# Patient Record
Sex: FEMALE | Race: WHITE | NOT HISPANIC OR LATINO | Employment: FULL TIME | URBAN - NONMETROPOLITAN AREA
[De-identification: names, ages, dates, MRNs, and addresses within clinical notes are randomized per-mention and may not be internally consistent; named-entity substitution may affect disease eponyms.]

---

## 2018-10-04 ENCOUNTER — HISTORICAL (OUTPATIENT)
Dept: ADMINISTRATIVE | Facility: HOSPITAL | Age: 23
End: 2018-10-04

## 2018-12-27 ENCOUNTER — HISTORICAL (OUTPATIENT)
Dept: ADMINISTRATIVE | Facility: HOSPITAL | Age: 23
End: 2018-12-27

## 2020-01-09 LAB
CLARITY, POC UA: CLEAR
COLOR, POC UA: YELLOW
GLUCOSE UR QL STRIP: NEGATIVE
LEUKOCYTE EST, POC UA: NEGATIVE
NITRITE, POC UA: NEGATIVE
PROTEIN, POC: NEGATIVE

## 2020-02-18 LAB
CLARITY, POC UA: CLEAR
COLOR, POC UA: YELLOW
GLUCOSE UR QL STRIP: NEGATIVE
LEUKOCYTE EST, POC UA: NEGATIVE
NITRITE, POC UA: NEGATIVE
PROTEIN, POC: NORMAL

## 2020-02-27 ENCOUNTER — HISTORICAL (OUTPATIENT)
Dept: ADMINISTRATIVE | Facility: HOSPITAL | Age: 25
End: 2020-02-27

## 2021-03-16 LAB
BILIRUB SERPL-MCNC: NEGATIVE MG/DL
CLARITY, POC UA: CLEAR
COLOR, POC UA: YELLOW
GLUCOSE UR QL STRIP: NEGATIVE
KETONES UR QL STRIP: NEGATIVE
LEUKOCYTE EST, POC UA: NEGATIVE
NITRITE, POC UA: NEGATIVE
PH, POC UA: 6
POC BETA-HCG (QUAL): POSITIVE
PROTEIN, POC: NEGATIVE
SPECIFIC GRAVITY, POC UA: 1.03
UROBILINOGEN, POC UA: NORMAL

## 2021-04-08 LAB
BASOPHILS # BLD AUTO: 0.01 X10(3)/MCL (ref 0.01–0.08)
BASOPHILS NFR BLD AUTO: 0.1 % (ref 0.1–1.2)
EOSINOPHIL # BLD AUTO: 0.06 X10(3)/MCL (ref 0.04–0.36)
EOSINOPHIL NFR BLD AUTO: 0.6 % (ref 0.7–7)
ERYTHROCYTE [DISTWIDTH] IN BLOOD BY AUTOMATED COUNT: 12.6 % (ref 11–14.5)
HCT VFR BLD AUTO: 41 % (ref 36–48)
HGB BLD-MCNC: 13.7 G/DL (ref 11.8–16)
IMM GRANULOCYTES # BLD AUTO: 0.01 X10E3/UL (ref 0–0.03)
IMM GRANULOCYTES NFR BLD AUTO: 0.1 % (ref 0–0.5)
LYMPHOCYTES # BLD AUTO: 2.44 X10(3)/MCL (ref 1.16–3.74)
LYMPHOCYTES NFR BLD AUTO: 25.8 % (ref 20–55)
MCH RBC QN AUTO: 30.4 PG (ref 27–34)
MCHC RBC AUTO-ENTMCNC: 33.4 G/DL (ref 31–37)
MCV RBC AUTO: 90.9 FL (ref 79–99)
MONOCYTES # BLD AUTO: 0.78 X10(3)/MCL (ref 0.24–0.36)
MONOCYTES NFR BLD AUTO: 8.2 % (ref 4.7–12.5)
NEUTROPHILS # BLD AUTO: 6.17 X10(3)/MCL (ref 1.56–6.13)
NEUTROPHILS NFR BLD AUTO: 65.2 % (ref 37–73)
PLATELET # BLD AUTO: 229 X10(3)/MCL (ref 140–371)
PMV BLD AUTO: 10.2 FL (ref 9.4–12.4)
RBC # BLD AUTO: 4.51 X10(6)/MCL (ref 4–5.1)
WBC # SPEC AUTO: 9.5 X10(3)/MCL (ref 4–11.5)

## 2021-06-01 ENCOUNTER — HISTORICAL (OUTPATIENT)
Dept: ADMINISTRATIVE | Facility: HOSPITAL | Age: 26
End: 2021-06-01

## 2021-08-25 LAB
BASOPHILS # BLD AUTO: 0.02 X10(3)/MCL (ref 0.01–0.08)
BASOPHILS NFR BLD AUTO: 0.2 % (ref 0.1–1.2)
EOSINOPHIL # BLD AUTO: 0.09 X10(3)/MCL (ref 0.04–0.36)
EOSINOPHIL NFR BLD AUTO: 0.9 % (ref 0.7–7)
ERYTHROCYTE [DISTWIDTH] IN BLOOD BY AUTOMATED COUNT: 11.8 % (ref 11–14.5)
GLUCOSE 1H P 100 G GLC PO SERPL-MCNC: 149 MG/DL (ref 70–140)
HCT VFR BLD AUTO: 32.6 % (ref 36–48)
HGB BLD-MCNC: 10.9 G/DL (ref 11.8–16)
IMM GRANULOCYTES # BLD AUTO: 0.04 X10E3/UL (ref 0–0.03)
IMM GRANULOCYTES NFR BLD AUTO: 0.4 % (ref 0–0.5)
LYMPHOCYTES # BLD AUTO: 1.9 X10(3)/MCL (ref 1.16–3.74)
LYMPHOCYTES NFR BLD AUTO: 19.9 % (ref 20–55)
MCH RBC QN AUTO: 30.8 PG (ref 27–34)
MCHC RBC AUTO-ENTMCNC: 33.4 G/DL (ref 31–37)
MCV RBC AUTO: 92.1 FL (ref 79–99)
MONOCYTES # BLD AUTO: 0.64 X10(3)/MCL (ref 0.24–0.36)
MONOCYTES NFR BLD AUTO: 6.7 % (ref 4.7–12.5)
NEUTROPHILS # BLD AUTO: 6.85 X10(3)/MCL (ref 1.56–6.13)
NEUTROPHILS NFR BLD AUTO: 71.9 % (ref 37–73)
PLATELET # BLD AUTO: 184 X10(3)/MCL (ref 140–371)
PMV BLD AUTO: 10.4 FL (ref 9.4–12.4)
RBC # BLD AUTO: 3.54 X10(6)/MCL (ref 4–5.1)
WBC # SPEC AUTO: 9.5 X10(3)/MCL (ref 4–11.5)

## 2021-09-28 LAB
CLARITY, POC UA: CLEAR
COLOR, POC UA: YELLOW
GLUCOSE UR QL STRIP: NEGATIVE
LEUKOCYTE EST, POC UA: NEGATIVE
NITRITE, POC UA: NEGATIVE
POC SITE: NORMAL
PROTEIN, POC: NEGATIVE

## 2021-10-04 LAB
CLARITY, POC UA: CLEAR
COLOR, POC UA: YELLOW
GLUCOSE UR QL STRIP: NEGATIVE
HBV SURFACE AG SERPL QL IA: NEGATIVE
HIV 1+2 AB+HIV1 P24 AG SERPL QL IA: NORMAL
LEUKOCYTE EST, POC UA: NEGATIVE
NITRITE, POC UA: NEGATIVE
PROTEIN, POC: NEGATIVE
RPR SER QL: NORMAL

## 2022-04-11 ENCOUNTER — HISTORICAL (OUTPATIENT)
Dept: ADMINISTRATIVE | Facility: HOSPITAL | Age: 27
End: 2022-04-11

## 2022-04-25 VITALS
DIASTOLIC BLOOD PRESSURE: 70 MMHG | SYSTOLIC BLOOD PRESSURE: 122 MMHG | BODY MASS INDEX: 26.01 KG/M2 | HEIGHT: 62 IN | WEIGHT: 141.31 LBS

## 2022-05-09 ENCOUNTER — HISTORICAL (OUTPATIENT)
Dept: ADMINISTRATIVE | Facility: HOSPITAL | Age: 27
End: 2022-05-09

## 2022-09-22 ENCOUNTER — HISTORICAL (OUTPATIENT)
Dept: ADMINISTRATIVE | Facility: HOSPITAL | Age: 27
End: 2022-09-22

## 2022-09-23 ENCOUNTER — HISTORICAL (OUTPATIENT)
Dept: ADMINISTRATIVE | Facility: HOSPITAL | Age: 27
End: 2022-09-23

## 2024-02-05 NOTE — PROGRESS NOTES
Chief Complaint: Annual exam      HPI:   Genie George is a 28 y.o. year old  here for her Annual Exam. Reports cyclic menses, 6 days in duration, heavy flow, severe dysmenorrhea. States dysmenorrhea to be increasing in severity the last several months.  Pain is worse on the left side.  Also has painful defecation during menses. Occ dyspareunia after menses. Denies bleeding between cycles, vaginal discharge, odor.     Gyn hx:  LMP: 24  Frequency: monthly  Cycle length: 6 days  Flow: heavy  Intermenstrual bleeding: No  Postcoital bleeding: No  Dysmenorrhea: Yes severe  Sexually active: yes  Dyspareunia: No  Contraception: none  H/o STI: No   Last pap: 3/16/21 ASCUS neg cz/gc  H/o abnl pap: Yes  Colposcopy: no  Gardasil: 0/3  MMG: never had one  H/o abnl MMG: n/a  Colonoscopy: never had one      No past medical history on file.  No past surgical history on file.  No current outpatient medications on file.  Review of patient's allergies indicates:  Not on File  OB History   No obstetric history on file.        No family history on file.    Review of Systems:   Review of Systems   Constitutional:  Negative for appetite change, chills, fatigue, fever and unexpected weight change.   Eyes:  Negative for visual disturbance.   Respiratory:  Negative for cough, shortness of breath and wheezing.    Cardiovascular:  Negative for chest pain, palpitations and leg swelling.   Gastrointestinal:  Negative for abdominal pain, bloating, blood in stool, constipation, diarrhea, nausea, vomiting, reflux and fecal incontinence.   Endocrine: Negative for hair loss and hot flashes.   Genitourinary:  Positive for dysmenorrhea, menorrhagia and pelvic pain. Negative for bladder incontinence, decreased libido, dyspareunia, dysuria, flank pain, frequency, genital sores, hematuria, hot flashes, menstrual problem, urgency, vaginal bleeding, vaginal discharge, vaginal pain, urinary incontinence, postcoital bleeding, postmenopausal  "bleeding, vaginal dryness and vaginal odor.   Integumentary:  Negative for rash, acne, hair changes, breast mass, nipple discharge, breast skin changes and breast tenderness.   Neurological:  Negative for headaches.   Psychiatric/Behavioral:  Negative for depression.    Breast: Negative for asymmetry, breast self exam, lump, mass, mastodynia, nipple discharge, skin changes and tenderness       Physical Exam:  /64 (BP Location: Right arm, Patient Position: Sitting, BP Method: Medium (Manual))   Temp 97.5 °F (36.4 °C) (Temporal)   Ht 5' 1" (1.549 m)   Wt 61.5 kg (135 lb 9.3 oz)   LMP 01/26/2024   BMI 25.62 kg/m²       Physical Exam:   Constitutional: She is oriented to person, place, and time. She appears well-developed and well-nourished.    HENT:   Head: Normocephalic.      Cardiovascular:       Exam reveals no edema.        Pulmonary/Chest: Effort normal. She exhibits no mass, no tenderness, no bony tenderness, no deformity and no retraction. Right breast exhibits no inverted nipple, no mass, no nipple discharge, no skin change, no tenderness, no bleeding, no swelling, no mastectomy, no augmentation and no lumpectomy. Left breast exhibits no inverted nipple, no mass, no nipple discharge, no skin change, no tenderness, no bleeding, no swelling, no mastectomy, no augmentation and no lumpectomy. Breasts are symmetrical.        Abdominal: Soft. She exhibits no distension and no mass. There is no abdominal tenderness. There is no rebound and no guarding. No hernia. Hernia confirmed negative in the right inguinal area.     Genitourinary:    Inguinal canal, vagina, uterus, right adnexa, left adnexa and rectum normal.   Rectum:      No anal fissure or external hemorrhoid.   The external female genitalia was normal.   No external genitalia lesions identified,Genitalia hair distrobution normal .     Labial bartholins normal.There is no rash, tenderness, lesion or injury on the right labia. There is no rash, " tenderness, lesion or injury on the left labia. Cervix is normal. No no masses or organomegaly. Right adnexum displays no mass, no tenderness and no fullness. Left adnexum displays no mass, no tenderness and no fullness. Vagina exhibits no lesion. No erythema, vaginal discharge, tenderness, bleeding, rectocele, cystocele or prolapse of vaginal walls in the vagina.    No foreign body in the vagina.      No signs of injury in the vagina.   Vagina was moist.Cervix exhibits no motion tenderness, no lesion, no discharge, no friability, no tenderness and no polyp. Uterus consistancy normal and Uerus contour normal  Uterus is not deviated, not enlarged, not fixed, not tender, not hosting fibroids and no uterine prolapse. Normal urethral meatus.Urethral Meatus exhibits: urethral lesionUrethra findings: no urethral mass, no tenderness and prolapsedBladder findings: no bladder tenderness          Musculoskeletal: Normal range of motion.      Lymphadenopathy: No inguinal adenopathy noted on the right or left side.    Neurological: She is alert and oriented to person, place, and time.    Skin: Skin is warm and dry.    Psychiatric: She has a normal mood and affect. Her behavior is normal. Judgment and thought content normal.        Assessment:   Annual Well Women Exam  1. Encounter for well woman exam with abnormal findings    2. Dysmenorrhea  - US Pelvis Comp with Transvag NON-OB (xpd; Future  - norethindrone-e.estradioL-iron (LO LOESTRIN FE) 1 mg-10 mcg (24)/10 mcg (2) Tab; Take 1 tablet by mouth once daily.  Dispense: 30 tablet; Refill: 11    3. Menorrhagia with regular cycle  - US Pelvis Comp with Transvag NON-OB (xpd; Future  - norethindrone-e.estradioL-iron (LO LOESTRIN FE) 1 mg-10 mcg (24)/10 mcg (2) Tab; Take 1 tablet by mouth once daily.  Dispense: 30 tablet; Refill: 11    4. Pelvic pain  - US Pelvis Comp with Transvag NON-OB (xpd; Future  - norethindrone-e.estradioL-iron (LO LOESTRIN FE) 1 mg-10 mcg (24)/10 mcg (2)  Tab; Take 1 tablet by mouth once daily.  Dispense: 30 tablet; Refill: 11    5. Painful defecation        Plan:  Pap Done  Oneswab GC CZ TV MYCO UREA   Breast Self-awareness  Recommend exercise at least 3 times weekly  Healthy, balanced diet  Keep yearly follow up with PCP    TVUS    Discussed dysmenorrhea, menorrhagia, pelvic pain and treatment options with pt  Discussed potential endometriosis   Discussed contraception options for menses, dysmenorrhea, menorrhagia  Desires trial of oral contraception  Rx Lo Loestrin  If no relief, consider Orilissa    Recommend Ibuprofen 800mg q 8 hours or 600mg q 6 hours as needed. Precautions     RTC 4 months f/u

## 2024-02-06 ENCOUNTER — OFFICE VISIT (OUTPATIENT)
Dept: OBSTETRICS AND GYNECOLOGY | Facility: CLINIC | Age: 29
End: 2024-02-06
Payer: COMMERCIAL

## 2024-02-06 VITALS
HEIGHT: 61 IN | WEIGHT: 135.56 LBS | DIASTOLIC BLOOD PRESSURE: 64 MMHG | BODY MASS INDEX: 25.59 KG/M2 | TEMPERATURE: 98 F | SYSTOLIC BLOOD PRESSURE: 116 MMHG

## 2024-02-06 DIAGNOSIS — N92.0 MENORRHAGIA WITH REGULAR CYCLE: ICD-10-CM

## 2024-02-06 DIAGNOSIS — R10.2 PELVIC PAIN: ICD-10-CM

## 2024-02-06 DIAGNOSIS — R19.8 PAINFUL DEFECATION: ICD-10-CM

## 2024-02-06 DIAGNOSIS — N94.6 DYSMENORRHEA: ICD-10-CM

## 2024-02-06 DIAGNOSIS — Z01.411 ENCOUNTER FOR WELL WOMAN EXAM WITH ABNORMAL FINDINGS: ICD-10-CM

## 2024-02-06 DIAGNOSIS — Z12.4 SCREENING FOR MALIGNANT NEOPLASM OF THE CERVIX: Primary | ICD-10-CM

## 2024-02-06 PROCEDURE — 1159F MED LIST DOCD IN RCRD: CPT | Mod: CPTII,,, | Performed by: OBSTETRICS & GYNECOLOGY

## 2024-02-06 PROCEDURE — 3008F BODY MASS INDEX DOCD: CPT | Mod: CPTII,,, | Performed by: OBSTETRICS & GYNECOLOGY

## 2024-02-06 PROCEDURE — 99395 PREV VISIT EST AGE 18-39: CPT | Mod: ,,, | Performed by: OBSTETRICS & GYNECOLOGY

## 2024-02-06 PROCEDURE — 3078F DIAST BP <80 MM HG: CPT | Mod: CPTII,,, | Performed by: OBSTETRICS & GYNECOLOGY

## 2024-02-06 PROCEDURE — 3074F SYST BP LT 130 MM HG: CPT | Mod: CPTII,,, | Performed by: OBSTETRICS & GYNECOLOGY

## 2024-02-06 RX ORDER — NORETHINDRONE ACETATE AND ETHINYL ESTRADIOL, ETHINYL ESTRADIOL AND FERROUS FUMARATE 1MG-10(24)
1 KIT ORAL DAILY
Qty: 30 TABLET | Refills: 11 | Status: SHIPPED | OUTPATIENT
Start: 2024-02-06 | End: 2024-05-30 | Stop reason: ALTCHOICE

## 2024-02-06 RX ORDER — IBUPROFEN 800 MG/1
800 TABLET ORAL EVERY 8 HOURS PRN
Qty: 90 TABLET | Refills: 2 | Status: SHIPPED | OUTPATIENT
Start: 2024-02-06

## 2024-02-08 ENCOUNTER — TELEPHONE (OUTPATIENT)
Dept: OBSTETRICS AND GYNECOLOGY | Facility: CLINIC | Age: 29
End: 2024-02-08
Payer: COMMERCIAL

## 2024-02-08 ENCOUNTER — HOSPITAL ENCOUNTER (OUTPATIENT)
Dept: RADIOLOGY | Facility: HOSPITAL | Age: 29
Discharge: HOME | End: 2024-02-08
Attending: OBSTETRICS & GYNECOLOGY
Payer: COMMERCIAL

## 2024-02-08 DIAGNOSIS — N92.0 MENORRHAGIA WITH REGULAR CYCLE: ICD-10-CM

## 2024-02-08 DIAGNOSIS — R10.2 PELVIC PAIN: ICD-10-CM

## 2024-02-08 DIAGNOSIS — N94.6 DYSMENORRHEA: ICD-10-CM

## 2024-02-08 DIAGNOSIS — N83.202 LEFT OVARIAN CYST: Primary | ICD-10-CM

## 2024-02-08 PROCEDURE — 76830 TRANSVAGINAL US NON-OB: CPT | Mod: TC

## 2024-02-08 NOTE — TELEPHONE ENCOUNTER
----- Message from Augie Calles MD sent at 2/8/2024 12:03 PM CST -----  Recommend repeat US in 6 weeks to f/u Left ov cyst

## 2024-02-09 LAB — PSYCHE PATHOLOGY RESULT: NORMAL

## 2024-03-11 ENCOUNTER — TELEPHONE (OUTPATIENT)
Dept: OBSTETRICS AND GYNECOLOGY | Facility: CLINIC | Age: 29
End: 2024-03-11
Payer: COMMERCIAL

## 2024-03-11 NOTE — TELEPHONE ENCOUNTER
----- Message from Nola Reyes sent at 3/11/2024  9:14 AM CDT -----  Regarding: PT Advice  Contact: Patient  CALLER: Patient  Telephone Information:  Mobile          [phone]  :  REASON FOR CALL:   Patient recently started her birth control on 2/20/24 and now has been on her cycle for 10 days. She has an US scheduled at the hospital tomorrow and asking if she should still go?      [id number] - [place] - [address] AT [facility] [place] [address]  Phone: [phone] Fax: [phone]  :

## 2024-03-12 ENCOUNTER — HOSPITAL ENCOUNTER (OUTPATIENT)
Dept: RADIOLOGY | Facility: HOSPITAL | Age: 29
Discharge: HOME | End: 2024-03-12
Attending: OBSTETRICS & GYNECOLOGY
Payer: COMMERCIAL

## 2024-03-12 DIAGNOSIS — N83.202 LEFT OVARIAN CYST: ICD-10-CM

## 2024-03-12 PROCEDURE — 76830 TRANSVAGINAL US NON-OB: CPT | Mod: TC

## 2024-03-13 ENCOUNTER — TELEPHONE (OUTPATIENT)
Dept: OBSTETRICS AND GYNECOLOGY | Facility: CLINIC | Age: 29
End: 2024-03-13
Payer: COMMERCIAL

## 2024-03-13 NOTE — TELEPHONE ENCOUNTER
Call and spoke with pt and per Dr Calles to let her  know the cyst has resolved and the other finding of moderately thickened endometrium is perfectly normal for a premenopausal woman. Pt states that she she still have concerns and that she have been bleeding for 12 days on birth control and if it is not endometrium if she needs to continue taking the birth control. I ask pt if she would like to schedule an appointment to come in and discuss the results with Dr Calles, pt stated that she would like to come in and speak with him. Pt was transferred to [facility] to schedule a appointment.

## 2024-03-13 NOTE — TELEPHONE ENCOUNTER
----- Message from Augie Calles MD sent at 3/13/2024 10:44 AM CDT -----  Please let her know the cyst has resolved and the other finding of moderately thickened endometrium is perfectly normal for a premenopausal woman.

## 2024-03-20 ENCOUNTER — OFFICE VISIT (OUTPATIENT)
Dept: OBSTETRICS AND GYNECOLOGY | Facility: CLINIC | Age: 29
End: 2024-03-20
Payer: COMMERCIAL

## 2024-03-20 VITALS
HEIGHT: 61 IN | WEIGHT: 140 LBS | SYSTOLIC BLOOD PRESSURE: 110 MMHG | BODY MASS INDEX: 26.43 KG/M2 | DIASTOLIC BLOOD PRESSURE: 66 MMHG

## 2024-03-20 DIAGNOSIS — R10.2 PELVIC PAIN: ICD-10-CM

## 2024-03-20 DIAGNOSIS — N83.202 LEFT OVARIAN CYST: ICD-10-CM

## 2024-03-20 DIAGNOSIS — N80.9 ENDOMETRIOSIS: Primary | ICD-10-CM

## 2024-03-20 DIAGNOSIS — N94.6 DYSMENORRHEA: ICD-10-CM

## 2024-03-20 DIAGNOSIS — L70.9 ACNE, UNSPECIFIED ACNE TYPE: ICD-10-CM

## 2024-03-20 DIAGNOSIS — N92.0 MENORRHAGIA WITH REGULAR CYCLE: ICD-10-CM

## 2024-03-20 PROCEDURE — 3008F BODY MASS INDEX DOCD: CPT | Mod: CPTII,,, | Performed by: OBSTETRICS & GYNECOLOGY

## 2024-03-20 PROCEDURE — 99213 OFFICE O/P EST LOW 20 MIN: CPT | Mod: ,,, | Performed by: OBSTETRICS & GYNECOLOGY

## 2024-03-20 PROCEDURE — 3074F SYST BP LT 130 MM HG: CPT | Mod: CPTII,,, | Performed by: OBSTETRICS & GYNECOLOGY

## 2024-03-20 PROCEDURE — 1159F MED LIST DOCD IN RCRD: CPT | Mod: CPTII,,, | Performed by: OBSTETRICS & GYNECOLOGY

## 2024-03-20 PROCEDURE — 3078F DIAST BP <80 MM HG: CPT | Mod: CPTII,,, | Performed by: OBSTETRICS & GYNECOLOGY

## 2024-03-20 RX ORDER — DROSPIRENONE AND ESTETROL 3-14.2(28)
1 KIT ORAL DAILY
Qty: 28 TABLET | Refills: 0 | COMMUNITY
Start: 2024-03-20 | End: 2024-04-06 | Stop reason: SDUPTHER

## 2024-03-20 NOTE — PROGRESS NOTES
Chief Complaint     Follow-up (F/u pelvic US. )    HPI:     Patient is a 28 y.o.  presents to follow up PAP, Oneswab, Pelvic u/s, Lo Loestrin for complaints of dysmenorrhea, menorrhagia, pelvic pain, painful defecation during menses, dyspareunia. Reports cycle lasting x2 weeks, stopped bleeding x2 days, began bleeding again x1 week. Reports 5 lbs weight gain since beginning contraception. She also developed acne almost immediately after starting the medication.       GYN HX:  LMP: 3/2/2024  Frequency: monthly  Cycle length: 6 days  Flow: heavy  Intermenstrual bleeding: No  Postcoital bleeding: No  Dysmenorrhea: Yes severe  Sexually active: yes  Dyspareunia: No  Contraception: none  H/o STI: No   Last pap: 2024 wnl  H/o abnl pap: Yes  Colposcopy: no  Gardasil: 0/3  MMG: never had one  H/o abnl MMG: n/a  Colonoscopy: never had one    24:  PAP: WNL  Oneswab GC CZ TV MYCO UREA: negative     Pelvic u/s 3/12/24:  FINDINGS:  Uterus: The uterus measures 8.0 x 4.4 x 4.1 cm with no worrisome myometrial abnormalities appreciated.  There is mild to moderate thickening of the endometrial stripe which measures 9 mm in AP thickness on transvaginal imaging.  Ovaries: The right ovary measures 3.1 x 2.1 x 2.5 cm in the left ovary measures 3.4 x 2.0 x 2.3 cm with no worrisome ovarian or adnexal abnormalities appreciated.  Miscellaneous: There is no evidence of free fluid within the pelvis and the patient was not tender during the examination.  Impression:  1. There is mild to moderate thickening of the endometrial stripe as described above.  See above comments..  2. The previously described (2024) left ovarian cyst has since resolved.    Per previous note 24:  Genie George is a 28 y.o. year old  here for her Annual Exam. Reports cyclic menses, 6 days in duration, heavy flow, severe dysmenorrhea. States dysmenorrhea to be increasing in severity the last several months.  Pain is worse on the left  "side.  Also has painful defecation during menses. Occ dyspareunia after menses. Denies bleeding between cycles, vaginal discharge, odor.        Past Medical History:   Diagnosis Date    SVT (supraventricular tachycardia)        Past Surgical History:   Procedure Laterality Date    WISDOM TOOTH EXTRACTION         Family History   Problem Relation Age of Onset    Cervical cancer Maternal Grandmother         20s    Endometrial cancer Maternal Grandmother     Hypertension Father     Hyperlipidemia Mother     Uterine cancer Neg Hx     Ovarian cancer Neg Hx        OB History          4    Para   3    Term   3            AB   1    Living   3         SAB   1    IAB        Ectopic        Multiple        Live Births   3                 Current Outpatient Medications on File Prior to Visit   Medication Sig Dispense Refill    ibuprofen (ADVIL,MOTRIN) 800 MG tablet Take 1 tablet (800 mg total) by mouth every 8 (eight) hours as needed for Pain. 90 tablet 2    norethindrone-e.estradioL-iron (LO LOESTRIN FE) 1 mg-10 mcg (24)/10 mcg (2) Tab Take 1 tablet by mouth once daily. 30 tablet 11     No current facility-administered medications on file prior to visit.       Review of Systems:       Review of Systems   Constitutional:  Negative for chills and fever.   Gastrointestinal:  Negative for abdominal pain, constipation and diarrhea.   Genitourinary:  Positive for dysmenorrhea, dyspareunia, menorrhagia and pelvic pain. Negative for bladder incontinence, decreased libido, dysuria, flank pain, frequency, genital sores, hematuria, hot flashes, menstrual problem, urgency, vaginal bleeding, vaginal discharge, vaginal pain, urinary incontinence, postcoital bleeding, postmenopausal bleeding, vaginal dryness and vaginal odor.        Physical Exam:    /66 (BP Location: Right arm)   Ht 5' 1" (1.549 m)   Wt 63.5 kg (140 lb)   LMP 2024   BMI 26.45 kg/m²     Physical Exam     deferred  Assessment:   1. " Endometriosis  -     drospirenone-estetrol (NEXTSTELLIS) 3 mg- 14.2 mg (28) Tab; Take 1 tablet by mouth once daily.  Dispense: 28 tablet; Refill: 0    2. Dysmenorrhea  -     drospirenone-estetrol (NEXTSTELLIS) 3 mg- 14.2 mg (28) Tab; Take 1 tablet by mouth once daily.  Dispense: 28 tablet; Refill: 0    3. Pelvic pain  -     drospirenone-estetrol (NEXTSTELLIS) 3 mg- 14.2 mg (28) Tab; Take 1 tablet by mouth once daily.  Dispense: 28 tablet; Refill: 0    4. Menorrhagia with regular cycle  -     drospirenone-estetrol (NEXTSTELLIS) 3 mg- 14.2 mg (28) Tab; Take 1 tablet by mouth once daily.  Dispense: 28 tablet; Refill: 0    5. Left ovarian cyst  -     drospirenone-estetrol (NEXTSTELLIS) 3 mg- 14.2 mg (28) Tab; Take 1 tablet by mouth once daily.  Dispense: 28 tablet; Refill: 0    6. Acne, unspecified acne type             Plan:       Reviewed pelvic u/s, PAP, oneswab results with patient.   Ovarian cyst has resolved. Moderately thickened endometrium, normal for a premenopausal woman.   Discussed with patient potential dx of endometriosis based off complaints of dysmenorrhea, pelvic pain, menorrhagia, painful defecation during menses, dyspareunia.    Working diagnosis of clinical endometriosis.    Discussed healthy calorie restricted diet, weight bearing exercise if tolerated. Discussed [employer] Shalini, Lose It SHALINI for calorie control.    Discussed pelvic pain, dysmenorrhea, menorrhagia, endometriosis in detail with patient. Discussed treatment options including contraception; OCP, IUD, DMPA, Orilissa, NSAIDs. Discussed Ibuprofen 600mg q 6 hours or 800mg q 8 hours at onset of pain, dx lap procedure, hysterectomy. Discussed each treatment option in detail including potential side effects and risks of each.   Discussed potential BTB with OCP, recommendation of full 3 month trial to allow adequate bleeding pattern.   Pt desires OCP change   Will do trial of nextstellis to see if this improved acne and weight gain    1  sample box of Nextstellis given to pt   LOT:   LOT: 7/2026  Will call office in 1 month. Will call in Rx if desires. RTC if no improvement.     RTC PRN    This note was transcribed by Cynthia Mcgee. There may be transcription errors as a result, however minimal. Effort has been made to ensure accuracy of transcription, but any obvious errors or omissions should be clarified with the author of the document.

## 2024-04-06 DIAGNOSIS — R10.2 PELVIC PAIN: ICD-10-CM

## 2024-04-06 DIAGNOSIS — N92.0 MENORRHAGIA WITH REGULAR CYCLE: ICD-10-CM

## 2024-04-06 DIAGNOSIS — N80.9 ENDOMETRIOSIS: ICD-10-CM

## 2024-04-06 DIAGNOSIS — N83.202 LEFT OVARIAN CYST: ICD-10-CM

## 2024-04-06 DIAGNOSIS — N94.6 DYSMENORRHEA: ICD-10-CM

## 2024-04-08 RX ORDER — DROSPIRENONE AND ESTETROL 3-14.2(28)
1 KIT ORAL DAILY
Qty: 28 TABLET | Refills: 11 | COMMUNITY
Start: 2024-04-08 | End: 2024-04-09 | Stop reason: SDUPTHER

## 2024-04-09 ENCOUNTER — TELEPHONE (OUTPATIENT)
Dept: OBSTETRICS AND GYNECOLOGY | Facility: CLINIC | Age: 29
End: 2024-04-09
Payer: COMMERCIAL

## 2024-04-09 DIAGNOSIS — N94.6 DYSMENORRHEA: ICD-10-CM

## 2024-04-09 DIAGNOSIS — N80.9 ENDOMETRIOSIS: ICD-10-CM

## 2024-04-09 DIAGNOSIS — N83.202 LEFT OVARIAN CYST: ICD-10-CM

## 2024-04-09 DIAGNOSIS — R10.2 PELVIC PAIN: ICD-10-CM

## 2024-04-09 DIAGNOSIS — N92.0 MENORRHAGIA WITH REGULAR CYCLE: ICD-10-CM

## 2024-04-09 RX ORDER — DROSPIRENONE AND ESTETROL 3-14.2(28)
1 KIT ORAL DAILY
Qty: 28 TABLET | Refills: 11 | COMMUNITY
Start: 2024-04-09 | End: 2024-05-07

## 2024-04-09 NOTE — TELEPHONE ENCOUNTER
----- Message from Brylee Guillory sent at 4/9/2024  3:33 PM CDT -----  Regarding: Refill  Contact: genie  Type:  RX Refill Request    Who Called: Genie  Refill or New Rx:refill  RX Name and Strength: drospirenone-estetrol (NEXTSTELLIS) 3 mg- 14.2 mg (28) Tab  How is the patient currently taking it? (ex. 1XDay):  Is this a 30 day or 90 day RX:  Preferred Pharmacy with phone number:  [facility] - [place], [place] - [address] [place] [address]  Phone: [phone] Fax: [phone]  Local or Mail Order:  Ordering Provider:  Would the patient rather a call back or a response via [facility]?   Best Call Back Number:  Additional Information:

## 2024-04-17 ENCOUNTER — TELEPHONE (OUTPATIENT)
Dept: OBSTETRICS AND GYNECOLOGY | Facility: CLINIC | Age: 29
End: 2024-04-17
Payer: COMMERCIAL

## 2024-04-17 DIAGNOSIS — N80.9 ENDOMETRIOSIS: ICD-10-CM

## 2024-04-17 DIAGNOSIS — R10.2 PELVIC PAIN: ICD-10-CM

## 2024-04-17 DIAGNOSIS — N83.202 LEFT OVARIAN CYST: ICD-10-CM

## 2024-04-17 DIAGNOSIS — N92.0 MENORRHAGIA WITH REGULAR CYCLE: ICD-10-CM

## 2024-04-17 DIAGNOSIS — N94.6 DYSMENORRHEA: ICD-10-CM

## 2024-04-17 RX ORDER — DROSPIRENONE AND ESTETROL 3-14.2(28)
1 KIT ORAL DAILY
Qty: 30 TABLET | Refills: 0 | COMMUNITY
Start: 2024-04-17 | End: 2024-05-17

## 2024-04-17 RX ORDER — DROSPIRENONE AND ESTETROL 3-14.2(28)
1 KIT ORAL DAILY
Qty: 30 TABLET | Refills: 0 | COMMUNITY
Start: 2024-04-17 | End: 2024-04-17

## 2024-04-17 NOTE — TELEPHONE ENCOUNTER
Pt placed call to office stating refill of Nextstellis not sent to pharmacy. Pharmacy will have to order medication and will not be in for few days. Placed call to other pharmacies, do not have in stock. Offered pt sample medication at this time. Sample medication given.    Nextstellis NDC [id number] LOT  EXP 7/2026- 1 box given

## 2024-05-07 ENCOUNTER — TELEPHONE (OUTPATIENT)
Dept: OBSTETRICS AND GYNECOLOGY | Facility: CLINIC | Age: 29
End: 2024-05-07
Payer: COMMERCIAL

## 2024-05-07 NOTE — TELEPHONE ENCOUNTER
----- Message from Nola Eric sent at 5/7/2024  9:15 AM CDT -----  Regarding: Pharmacy Authorization  Contact: Pharmacy  [facility] Called stating they did not receive the RX for NEXTSTELLIS.    [facility] - [place] [address] [place] [address]  Phone: [phone] Fax: [phone]    .

## 2024-05-07 NOTE — TELEPHONE ENCOUNTER
Verbal order called into pharmacy spoke with [facility] pharmacist, @ [facility]

## 2024-05-20 ENCOUNTER — LAB VISIT (OUTPATIENT)
Dept: LAB | Facility: HOSPITAL | Age: 29
End: 2024-05-20
Attending: FAMILY MEDICINE
Payer: COMMERCIAL

## 2024-05-20 DIAGNOSIS — Z00.00 ROUTINE GENERAL MEDICAL EXAMINATION AT A HEALTH CARE FACILITY: Primary | ICD-10-CM

## 2024-05-20 LAB
ALBUMIN SERPL-MCNC: 4.4 G/DL (ref 3.4–5)
ALBUMIN/GLOB SERPL: 1.8 RATIO
ALP SERPL-CCNC: 48 UNIT/L (ref 50–144)
ALT SERPL-CCNC: 23 UNIT/L (ref 1–45)
ANION GAP SERPL CALC-SCNC: 7 MEQ/L (ref 2–13)
AST SERPL-CCNC: 25 UNIT/L (ref 14–36)
BASOPHILS # BLD AUTO: 0.03 X10(3)/MCL (ref 0.01–0.08)
BASOPHILS NFR BLD AUTO: 0.5 % (ref 0.1–1.2)
BILIRUB SERPL-MCNC: 0.3 MG/DL (ref 0–1)
BUN SERPL-MCNC: 13 MG/DL (ref 7–20)
CALCIUM SERPL-MCNC: 9.3 MG/DL (ref 8.4–10.2)
CHLORIDE SERPL-SCNC: 107 MMOL/L (ref 98–110)
CHOLEST SERPL-MCNC: 224 MG/DL (ref 0–200)
CO2 SERPL-SCNC: 26 MMOL/L (ref 21–32)
CREAT SERPL-MCNC: 0.79 MG/DL (ref 0.66–1.25)
CREAT/UREA NIT SERPL: 16 (ref 12–20)
EOSINOPHIL # BLD AUTO: 0.11 X10(3)/MCL (ref 0.04–0.36)
EOSINOPHIL NFR BLD AUTO: 1.7 % (ref 0.7–7)
ERYTHROCYTE [DISTWIDTH] IN BLOOD BY AUTOMATED COUNT: 14.7 % (ref 11–14.5)
GFR SERPLBLD CREATININE-BSD FMLA CKD-EPI: >90 ML/MIN/1.73/M2
GLOBULIN SER-MCNC: 2.5 GM/DL (ref 2–3.9)
GLUCOSE SERPL-MCNC: 98 MG/DL (ref 70–115)
HCT VFR BLD AUTO: 36.5 % (ref 36–48)
HDLC SERPL-MCNC: 80 MG/DL (ref 40–60)
HGB BLD-MCNC: 12.6 G/DL (ref 11.8–16)
IMM GRANULOCYTES # BLD AUTO: 0.01 X10(3)/MCL (ref 0–0.03)
IMM GRANULOCYTES NFR BLD AUTO: 0.2 % (ref 0–0.5)
LDLC SERPL DIRECT ASSAY-SCNC: 119.8 MG/DL (ref 30–100)
LYMPHOCYTES # BLD AUTO: 3.14 X10(3)/MCL (ref 1.16–3.74)
LYMPHOCYTES NFR BLD AUTO: 48.5 % (ref 20–55)
MCH RBC QN AUTO: 29.8 PG (ref 27–34)
MCHC RBC AUTO-ENTMCNC: 34.5 G/DL (ref 31–37)
MCV RBC AUTO: 86.3 FL (ref 79–99)
MONOCYTES # BLD AUTO: 0.5 X10(3)/MCL (ref 0.24–0.36)
MONOCYTES NFR BLD AUTO: 7.7 % (ref 4.7–12.5)
NEUTROPHILS # BLD AUTO: 2.69 X10(3)/MCL (ref 1.56–6.13)
NEUTROPHILS NFR BLD AUTO: 41.4 % (ref 37–73)
NRBC BLD AUTO-RTO: 0 %
PLATELET # BLD AUTO: 195 X10(3)/MCL (ref 140–371)
PMV BLD AUTO: 9.8 FL (ref 9.4–12.4)
POTASSIUM SERPL-SCNC: 4.3 MMOL/L (ref 3.5–5.1)
PROT SERPL-MCNC: 6.9 GM/DL (ref 6.3–8.2)
RBC # BLD AUTO: 4.23 X10(6)/MCL (ref 4–5.1)
SODIUM SERPL-SCNC: 140 MMOL/L (ref 136–145)
TRIGL SERPL-MCNC: 51 MG/DL (ref 30–200)
TSH SERPL-ACNC: 0.49 UIU/ML (ref 0.36–3.74)
WBC # SPEC AUTO: 6.48 X10(3)/MCL (ref 4–11.5)

## 2024-05-20 PROCEDURE — 84443 ASSAY THYROID STIM HORMONE: CPT

## 2024-05-20 PROCEDURE — 80061 LIPID PANEL: CPT

## 2024-05-20 PROCEDURE — 80053 COMPREHEN METABOLIC PANEL: CPT

## 2024-05-20 PROCEDURE — 36415 COLL VENOUS BLD VENIPUNCTURE: CPT

## 2024-05-20 PROCEDURE — 85025 COMPLETE CBC W/AUTO DIFF WBC: CPT

## 2024-05-28 NOTE — PROGRESS NOTES
"  Chief Complaint     Follow-up (F/u on Nextstellus. Pt reports "Pain is much more manageable now.")    HPI:     Patient is a 28 y.o.  presents to follow up Nextstellis OCP for complaints of endometriosis, dysmenorrhea, pelvic pain, menorrhagia, and painful defecation.  She had acne with previous OCP. Pt reports improvement in symptoms with medication. States pain to be manageable, acne significantly improved. Overall satisfied with the medication.     Gyn History:    Menstrual History   Cycle: Yes  Menarche Age: 11 years  Flow Duration: 7  Flow: Normal  Intermenstrual Bleeding: Yes  Dysmenorrhea: No  [place]  Sexually Active: Yes  Sexual Orientation: heterosexual  Postcoital Bleeding: No  Dyspareunia: No  STI History: No  Contraception: Yes (Nexstellus)  Contraception Type: Oral contraceptives  Menopause  Menopause Age: 1 years  Breast History  Last Breast Imaging Date: No  History of Breast Biopsy: No  Pap History   Last pap date: 24  Result: Normal  History of Abnormal Pap: No  HPV Vaccine Completed: No        Past Medical History:   Diagnosis Date    SVT (supraventricular tachycardia)        Past Surgical History:   Procedure Laterality Date    WISDOM TOOTH EXTRACTION         Family History   Problem Relation Name Age of Onset    Cervical cancer Maternal Grandmother Lauralie         20s    Endometrial cancer Maternal Grandmother Lauralie     Hypertension Father Luigi     Hyperlipidemia Mother Nanci     Uterine cancer Neg Hx      Ovarian cancer Neg Hx         OB History          4    Para   3    Term   3            AB   1    Living   3         SAB   1    IAB        Ectopic        Multiple        Live Births   3                 Current Outpatient Medications on File Prior to Visit   Medication Sig Dispense Refill    drospirenone-estetrol (NEXTSTELLIS) 3 mg- 14.2 mg (28) Tab Take 1 tablet by mouth Daily.      ibuprofen (ADVIL,MOTRIN) 800 MG tablet Take 1 tablet (800 mg total) by " "mouth every 8 (eight) hours as needed for Pain. 90 tablet 2    [DISCONTINUED] norethindrone-e.estradioL-iron (LO LOESTRIN FE) 1 mg-10 mcg (24)/10 mcg (2) Tab Take 1 tablet by mouth once daily. 30 tablet 11     No current facility-administered medications on file prior to visit.       Review of Systems:       Review of Systems   Constitutional:  Negative for chills and fever.   Gastrointestinal:  Negative for abdominal pain, constipation and diarrhea.   Genitourinary:  Negative for bladder incontinence, decreased libido, dysmenorrhea, dyspareunia, dysuria, flank pain, frequency, genital sores, hematuria, hot flashes, menorrhagia, menstrual problem, pelvic pain, urgency, vaginal bleeding, vaginal discharge, vaginal pain, urinary incontinence, postcoital bleeding, postmenopausal bleeding, vaginal dryness and vaginal odor.        Physical Exam:    /60 (BP Location: Left arm, Patient Position: Sitting)   Ht 5' 1" (1.549 m)   Wt 62.6 kg (138 lb)   LMP 05/28/2024   BMI 26.07 kg/m²     Physical Exam   General Exam:        Assessment:   1. Dysmenorrhea    2. Menorrhagia with regular cycle    3. Painful defecation    4. Pelvic pain             Plan:       Content, desires to continue Nextstellis.   Call if symptoms worsen    RTC PRN/ANNUAL      This note was transcribed by Cynthia Mcgee. There may be transcription errors as a result, however minimal. Effort has been made to ensure accuracy of transcription, but any obvious errors or omissions should be clarified with the author of the document.         "

## 2024-05-30 ENCOUNTER — OFFICE VISIT (OUTPATIENT)
Dept: OBSTETRICS AND GYNECOLOGY | Facility: CLINIC | Age: 29
End: 2024-05-30
Payer: COMMERCIAL

## 2024-05-30 VITALS
HEIGHT: 61 IN | WEIGHT: 138 LBS | DIASTOLIC BLOOD PRESSURE: 60 MMHG | BODY MASS INDEX: 26.06 KG/M2 | SYSTOLIC BLOOD PRESSURE: 102 MMHG

## 2024-05-30 DIAGNOSIS — N92.0 MENORRHAGIA WITH REGULAR CYCLE: ICD-10-CM

## 2024-05-30 DIAGNOSIS — N94.6 DYSMENORRHEA: Primary | ICD-10-CM

## 2024-05-30 DIAGNOSIS — R10.2 PELVIC PAIN: ICD-10-CM

## 2024-05-30 DIAGNOSIS — R19.8 PAINFUL DEFECATION: ICD-10-CM

## 2024-05-30 PROCEDURE — 1159F MED LIST DOCD IN RCRD: CPT | Mod: CPTII,,, | Performed by: OBSTETRICS & GYNECOLOGY

## 2024-05-30 PROCEDURE — 3078F DIAST BP <80 MM HG: CPT | Mod: CPTII,,, | Performed by: OBSTETRICS & GYNECOLOGY

## 2024-05-30 PROCEDURE — 3074F SYST BP LT 130 MM HG: CPT | Mod: CPTII,,, | Performed by: OBSTETRICS & GYNECOLOGY

## 2024-05-30 PROCEDURE — 3008F BODY MASS INDEX DOCD: CPT | Mod: CPTII,,, | Performed by: OBSTETRICS & GYNECOLOGY

## 2024-05-30 PROCEDURE — 99213 OFFICE O/P EST LOW 20 MIN: CPT | Mod: ,,, | Performed by: OBSTETRICS & GYNECOLOGY

## 2024-05-30 RX ORDER — DROSPIRENONE AND ESTETROL 3-14.2(28)
1 KIT ORAL DAILY
COMMUNITY

## 2025-02-14 NOTE — PROGRESS NOTES
Chief Complaint: Annual exam    Chief Complaint   Patient presents with    Well Woman     Pt is here for annual GYN exam.       HPI:   Genie George is a 29 y.o. year old  here for her Annual Exam. Cyclic menses, 6 days in duration, heavy flow, moderate dysmenorrhea. Discontinued OCP due to weight gain, hair loss. Reports doing well without contraception at this time. Denies abnormal discharge, odor, bleeding.     Cancer-related family history includes Cancer in her maternal grandmother; Cervical cancer in her maternal grandmother. There is no history of Uterine cancer or Ovarian cancer.      Gyn History:    Menstrual History  Cycle: Yes (LMP:25)  Menarche Age: 11 years  Flow Duration: 6  Flow: (!) Heavy  Interval: 28  Intermenstrual Bleeding: No  Dysmenorrhea: Yes  Dysmenorrhea Severity : Moderate    Menopause  Menopause Age: 1 years    Pap History  Last pap date: 24  Result: Normal (NIL)  History of Abnormal Pap: (!) Yes  Treatment used:  (re pap)  HPV Vaccine Completed: No    [place]  Sexually Active: Yes  Sexual Orientation: heterosexual  Postcoital Bleeding: No  Dyspareunia: No  STI History: No  Contraception: No    Breast History  Last Breast Imaging Date: No  History of Breast Biopsy: No            Past Medical History:   Diagnosis Date    Abnormal uterine bleeding     Dysmenorrhea     Dyspareunia     Endometriosis of uterus     SVT (supraventricular tachycardia)      Past Surgical History:   Procedure Laterality Date    WISDOM TOOTH EXTRACTION         Current Outpatient Medications:     drospirenone-estetrol (NEXTSTELLIS) 3 mg- 14.2 mg (28) Tab, Take 1 tablet by mouth Daily., Disp: , Rfl:     ibuprofen (ADVIL,MOTRIN) 800 MG tablet, Take 1 tablet (800 mg total) by mouth every 8 (eight) hours as needed for Pain., Disp: 90 tablet, Rfl: 2  Review of patient's allergies indicates:   Allergen Reactions    Penicillins Hives     OB History    Para Term  AB Living   4 3 3  1 3    SAB IAB Ectopic Multiple Live Births   1    3      # Outcome Date GA Lbr Wai/2nd Weight Sex Type Anes PTL Lv   4 Term 10/14/21   3.402 kg (7 lb 8 oz) F Vag-Spont EPI  ELDER      Complications: Vacuum-assisted vaginal delivery, Umbilical cord around neck in labor and delivery   3 Term 07/07/20   3.515 kg (7 lb 12 oz) F Vag-Spont EPI N ELDER      Complications: Vacuum-assisted vaginal delivery   2 Term 02/12/19   3.289 kg (7 lb 4 oz) M Vag-Spont EPI N ELDER   1 SAB 12/01/18 5w0d    SAB   FD     Social History     Tobacco Use    Smoking status: Never    Smokeless tobacco: Never   Substance Use Topics    Alcohol use: Yes     Alcohol/week: 1.0 standard drink of alcohol     Types: 1 Drinks containing 0.5 oz of alcohol per week     Comment: occasionally    Drug use: Never     Family History   Problem Relation Name Age of Onset    Cervical cancer Maternal Grandmother Rosa         20s    Endometrial cancer Maternal Grandmother Lauralie     Cancer Maternal Grandmother Rosa     Hypertension Father Luigi     Hyperlipidemia Mother Nanci     Uterine cancer Neg Hx      Ovarian cancer Neg Hx         Review of Systems:   Review of Systems   Constitutional:  Negative for appetite change, chills, fatigue, fever and unexpected weight change.   Eyes:  Negative for visual disturbance.   Respiratory:  Negative for cough, shortness of breath and wheezing.    Cardiovascular:  Negative for chest pain, palpitations and leg swelling.   Gastrointestinal:  Negative for abdominal pain, bloating, blood in stool, constipation, diarrhea, nausea, vomiting, reflux and fecal incontinence.   Endocrine: Negative for hair loss and hot flashes.   Genitourinary:  Negative for bladder incontinence, decreased libido, dysmenorrhea, dyspareunia, dysuria, flank pain, frequency, genital sores, hematuria, hot flashes, menorrhagia, menstrual problem, pelvic pain, urgency, vaginal bleeding, vaginal discharge, vaginal pain, urinary incontinence, postcoital  "bleeding, postmenopausal bleeding, vaginal dryness and vaginal odor.   Integumentary:  Negative for rash, acne, hair changes, breast mass, nipple discharge, breast skin changes and breast tenderness.   Neurological:  Negative for headaches.   Psychiatric/Behavioral:  Negative for depression.    Breast: Negative for asymmetry, breast self exam, lump, mass, mastodynia, nipple discharge, skin changes and tenderness       Physical Exam:  /64 (BP Location: Left arm, Patient Position: Sitting)   Ht 5' 1" (1.549 m)   Wt 64.6 kg (142 lb 6.4 oz)   LMP 02/17/2025 (Exact Date)   BMI 26.91 kg/m²       Physical Exam:   Constitutional: She is oriented to person, place, and time. She appears well-developed and well-nourished.    HENT:   Head: Normocephalic.      Cardiovascular:       Exam reveals no edema.        Pulmonary/Chest: Effort normal. She exhibits no mass, no tenderness, no bony tenderness, no deformity and no retraction. Right breast exhibits no inverted nipple, no mass, no nipple discharge, no skin change, no tenderness, no bleeding, no swelling, no mastectomy, no augmentation and no lumpectomy. Left breast exhibits no inverted nipple, no mass, no nipple discharge, no skin change, no tenderness, no bleeding, no swelling, no mastectomy, no augmentation and no lumpectomy. Breasts are symmetrical.        Abdominal: Soft. She exhibits no distension and no mass. There is no abdominal tenderness. There is no rebound and no guarding. No hernia. Hernia confirmed negative in the right inguinal area.     Genitourinary:    Inguinal canal, vagina, uterus, right adnexa, left adnexa and rectum normal.   Rectum:      No anal fissure or external hemorrhoid.   The external female genitalia was normal.   No external genitalia lesions identified,Genitalia hair distrobution normal .     Labial bartholins normal.There is no rash, tenderness, lesion or injury on the right labia. There is no rash, tenderness, lesion or injury on " the left labia. Cervix is normal. No no masses or organomegaly. Right adnexum displays no mass, no tenderness and no fullness. Left adnexum displays no mass, no tenderness and no fullness. Vagina exhibits no lesion. No erythema, vaginal discharge, tenderness, bleeding, rectocele, cystocele or prolapse of vaginal walls in the vagina.    No foreign body in the vagina.      No signs of injury in the vagina.   Vagina was moist.Cervix exhibits no motion tenderness, no lesion, no discharge, no friability, no tenderness and no polyp. Uterus consistancy normal and Uerus contour normal  Uterus is not deviated, not enlarged, not fixed, not tender, not hosting fibroids and no uterine prolapse. Normal urethral meatus.Urethral Meatus exhibits: no urethral lesionUrethra findings: no urethral mass, no tenderness and no prolapsedBladder findings: no bladder tenderness          Musculoskeletal: Normal range of motion.      Lymphadenopathy: No inguinal adenopathy noted on the right or left side.    Neurological: She is alert and oriented to person, place, and time.    Skin: Skin is warm and dry.    Psychiatric: She has a normal mood and affect. Her behavior is normal. Judgment and thought content normal.        Assessment:   Annual Well Women Exam  1. Well woman exam with routine gynecological exam    2. Menorrhagia with regular cycle    3. Dysmenorrhea        Plan:  Pap UTD  Breast Self-awareness  Recommend exercise at least 3 times weekly  Healthy, balanced diet  Keep yearly follow up with PCP  Symptoms controlled without medications since discontinuing.  Patient wants to avoid contraception for now.  RTC prn/annual    .      This note was transcribed by Cynthia Mcgee. There may be transcription errors as a result, however minimal. Effort has been made to ensure accuracy of transcription, but any obvious errors or omissions should be clarified with the author of the document.       I agree with the above documentation.

## 2025-02-18 ENCOUNTER — OFFICE VISIT (OUTPATIENT)
Dept: OBSTETRICS AND GYNECOLOGY | Facility: CLINIC | Age: 30
End: 2025-02-18
Payer: COMMERCIAL

## 2025-02-18 VITALS
WEIGHT: 142.38 LBS | HEIGHT: 61 IN | BODY MASS INDEX: 26.88 KG/M2 | DIASTOLIC BLOOD PRESSURE: 64 MMHG | SYSTOLIC BLOOD PRESSURE: 108 MMHG

## 2025-02-18 DIAGNOSIS — N94.6 DYSMENORRHEA: ICD-10-CM

## 2025-02-18 DIAGNOSIS — Z01.419 WELL WOMAN EXAM WITH ROUTINE GYNECOLOGICAL EXAM: Primary | ICD-10-CM

## 2025-02-18 DIAGNOSIS — N92.0 MENORRHAGIA WITH REGULAR CYCLE: ICD-10-CM

## 2025-07-16 NOTE — PROGRESS NOTES
Chief Complaint     Abdominal Pain (Pt c/o extreme abdominal pain and cramping during menstrual cycle)    HPI:     Patient is a 29 y.o.  with a history of pelvic pain, painful defecation, dyspareunia, abdominal pain, menorrhagia last year presents complaining of recurrent symptoms.  Last year we initially attempted an oral contraceptive pill which she did not tolerate due to weight gain.  We switched to Nextstellis which did help decrease the severity of pain but she had other untenable side effects.  The patient says despite the decrease in general pelvic pain with the next day unless she continued to have painful defecation.  Since stopping the medication she has had increasingly severe pain.  During this last cycle the pain became nearly unbearable.  The patient says she also has fairly intense epigastric pain during menses as well.    Concerning bowel movements, patient says her typical routine is to have about 1 bowel movement for a week.  During menses she has significant increase in abdominal pain and discomfort in the up having several bowel movements a day.  Bowel movements or extremely painful and worse during her cycles.  Minimal relief pain with over-the-counter NSAIDs.  Denies blood in stool, constipation, diarrhea.    One swab last year was normal.  Denies discharge or abnormal bleeding.    US Pelvis 3/12/24  FINDINGS:  Uterus: The uterus measures 8.0 x 4.4 x 4.1 cm with no worrisome myometrial abnormalities appreciated.  There is mild to moderate thickening of the endometrial stripe which measures 9 mm in AP thickness on transvaginal imaging.     Ovaries: The right ovary measures 3.1 x 2.1 x 2.5 cm in the left ovary measures 3.4 x 2.0 x 2.3 cm with no worrisome ovarian or adnexal abnormalities appreciated.     Miscellaneous: There is no evidence of free fluid within the pelvis and the patient was not tender during the examination.     Impression:     1. There is mild to moderate thickening of  the endometrial stripe as described above.  See above comments..  2. The previously described (02/08/2024) left ovarian cyst has since resolved.    US Pelvis 2/8/24  FINDINGS:  Uterus: The uterus measures 9.3 x 5.5 x 5.7 cm with no worrisome myometrial abnormalities appreciated.  There is thickening of the endometrial stripe which measures 1.4 cm in AP thickness on transvaginal imaging.     Ovaries: The right ovary measures 3.4 x 1.7 x 1.6 cm in the left ovary measures 3.1 x 3.3 x 3.7 cm with a 2.5 cm, moderately tender cyst measuring 2.5 cm in greatest diameter and containing a thin internal septation.  No worrisome perfusion is noted on color-flow mapping.     Miscellaneous: There is no evidence of free fluid within the pelvis.     Impression:     1. There is thickening of the endometrial stripe which measures 1.4 cm in AP thickness on transvaginal imaging.  2. A moderately tender, 2.5 cm cyst with a thin internal septation is noted originating from the left ovary.  See above comments.      Gyn History:    Menstrual History  Cycle: Yes (7/4/25)  Menarche Age: 11 years  Flow Duration: 6  Flow: (!) Heavy  Interval: 28  Intermenstrual Bleeding: No  Dysmenorrhea: Yes  Dysmenorrhea Severity : (!) Severe    Menopause  Menopause Age: 1 years    Pap History  Last pap date: 02/06/24  Result: Normal  History of Abnormal Pap: (!) Yes  HPV Vaccine Completed: No    [place]  Sexually Active: Yes  Sexual Orientation: heterosexual  Postcoital Bleeding: No  Dyspareunia: No  STI History: No  Contraception: No    Breast History  Last Breast Imaging Date: No  History of Breast Biopsy: No        Past Medical History:   Diagnosis Date    Abnormal uterine bleeding     Dysmenorrhea     Dyspareunia     Endometriosis of uterus     SVT (supraventricular tachycardia)        Past Surgical History:   Procedure Laterality Date    WISDOM TOOTH EXTRACTION         Family History   Problem Relation Name Age of Onset    Cervical cancer Maternal  "Grandmother Betoalie         20s    Endometrial cancer Maternal Grandmother Betoalie     Cancer Maternal Grandmother Rosa     Hypertension Father Luigi     Hyperlipidemia Mother Nanci     Uterine cancer Neg Hx      Ovarian cancer Neg Hx         OB History          4    Para   3    Term   3            AB   1    Living   3         SAB   1    IAB        Ectopic        Multiple        Live Births   3                 Current Outpatient Medications on File Prior to Visit   Medication Sig Dispense Refill    ibuprofen (ADVIL,MOTRIN) 800 MG tablet Take 1 tablet (800 mg total) by mouth every 8 (eight) hours as needed for Pain. 90 tablet 2    [DISCONTINUED] drospirenone-estetrol (NEXTSTELLIS) 3 mg- 14.2 mg (28) Tab Take 1 tablet by mouth Daily. (Patient not taking: Reported on 2025)       No current facility-administered medications on file prior to visit.       Review of Systems:       Review of Systems   Constitutional:  Negative for chills and fever.   Gastrointestinal:  Negative for abdominal pain, constipation and diarrhea.   Genitourinary:  Positive for dysmenorrhea, menstrual problem and pelvic pain. Negative for bladder incontinence, decreased libido, dyspareunia, dysuria, flank pain, frequency, genital sores, hematuria, hot flashes, menorrhagia, urgency, vaginal bleeding, vaginal discharge, vaginal pain, urinary incontinence, postcoital bleeding, postmenopausal bleeding, vaginal dryness and vaginal odor.        Physical Exam:    BP (!) 102/58 (BP Location: Right arm, Patient Position: Sitting)   Ht 5' 2" (1.575 m)   Wt 62.4 kg (137 lb 9.6 oz)   LMP 2025   BMI 25.17 kg/m²     Physical Exam   Chaperone: present.    General Exam:    General Appearance: alert, in no acute distress, normal, well nourished.  Psych:  Orientation: time, place, person.  Mood/Affect: Normal       Assessment:   1. Pelvic pain  -     US Pelvis Comp with Transvag NON-OB (xpd; Future; Expected date: 2025  -  "    CT Abdomen Pelvis With IV Contrast Rectal Contrast; Future; Expected date: 07/23/2025    2. Epigastric pain  -     CT Abdomen Pelvis With IV Contrast Rectal Contrast; Future; Expected date: 07/23/2025    3. Lower abdominal pain  -     POCT Urinalysis, Dipstick, Automated, W/O Scope    4. Dyspareunia due to medical condition in female  -     US Pelvis Comp with Transvag NON-OB (xpd; Future; Expected date: 07/23/2025  -     CT Abdomen Pelvis With IV Contrast Rectal Contrast; Future; Expected date: 07/23/2025    5. Upper abdominal pain  -     US Pelvis Comp with Transvag NON-OB (xpd; Future; Expected date: 07/23/2025  -     CT Abdomen Pelvis With IV Contrast Rectal Contrast; Future; Expected date: 07/23/2025    6. Painful defecation  -     US Pelvis Comp with Transvag NON-OB (xpd; Future; Expected date: 07/23/2025  -     CT Abdomen Pelvis With IV Contrast Rectal Contrast; Future; Expected date: 07/23/2025             Plan:   1. Abdominal cramping    2. Abdominal pain, unspecified abdominal location  - POCT Urinalysis, Dipstick, Automated, W/O Scope    3. Pelvic pain  - US Pelvis Comp with Transvag NON-OB (xpd; Future  - CT Abdomen Pelvis With IV Contrast Rectal Contrast; Future    4. Dyspareunia due to medical condition in female  - US Pelvis Comp with Transvag NON-OB (xpd; Future  - CT Abdomen Pelvis With IV Contrast Rectal Contrast; Future    5. Upper abdominal pain  - US Pelvis Comp with Transvag NON-OB (xpd; Future  - CT Abdomen Pelvis With IV Contrast Rectal Contrast; Future    6. Painful defecation  - US Pelvis Comp with Transvag NON-OB (xpd; Future  - CT Abdomen Pelvis With IV Contrast Rectal Contrast; Future    7. Pelvic and perineal pain  - CT Abdomen Pelvis With IV Contrast Rectal Contrast; Future    8. Epigastric pain  - CT Abdomen Pelvis With IV Contrast Rectal Contrast; Future      Reviewed last year's imaging.  We discussed her symptoms in detail.  I suspect endometriosis as the source of her her pain.   I am concerned about the painful defecation.  I recommend repeat pelvic ultrasound as well as CT abdomen and pelvis with rectal contrast to assess for other source of painful difficulty with defecation prior to further intervention.  Also recommend referral for colonoscopy.    We discussed treatment options for chronic pelvic pain and endometriosis.  Discussed treatment options including contraception; OCP, IUD, DMPA, [place], NSAIDs.  Discussed potential progesterone only contraception due to previous side effects  Discussed Ibuprofen 600mg q 6 hours or 800mg q 8 hours at onset of pain, dx lap procedure, hysterectomy. Discussed each treatment option in detail including potential side effects and risks of each.     Given her lack of response to medication in the past and continued pain with a defecation, I would recommend diagnostic laparoscopy for assessment of an fulguration of endometriosis if present.    Will send referral to the [facility] for establishment and colonoscopy     Will have pelvic us and CT abdomen and pelvis with rectal contrast prior to procedure     RTC for results and pre-op    This note was transcribed by Cynthia Mcgee. There may be transcription errors as a result, however minimal. Effort has been made to ensure accuracy of transcription, but any obvious errors or omissions should be clarified with the author of the document.       I agree with the above documentation.

## 2025-07-23 ENCOUNTER — OFFICE VISIT (OUTPATIENT)
Dept: OBSTETRICS AND GYNECOLOGY | Facility: CLINIC | Age: 30
End: 2025-07-23
Payer: COMMERCIAL

## 2025-07-23 VITALS
WEIGHT: 137.63 LBS | DIASTOLIC BLOOD PRESSURE: 58 MMHG | BODY MASS INDEX: 25.33 KG/M2 | HEIGHT: 62 IN | SYSTOLIC BLOOD PRESSURE: 102 MMHG

## 2025-07-23 DIAGNOSIS — R10.13 EPIGASTRIC PAIN: ICD-10-CM

## 2025-07-23 DIAGNOSIS — R10.10 UPPER ABDOMINAL PAIN: ICD-10-CM

## 2025-07-23 DIAGNOSIS — R10.30 LOWER ABDOMINAL PAIN: ICD-10-CM

## 2025-07-23 DIAGNOSIS — R10.2 PELVIC PAIN: Primary | ICD-10-CM

## 2025-07-23 DIAGNOSIS — R19.8 PAINFUL DEFECATION: ICD-10-CM

## 2025-07-23 DIAGNOSIS — N94.19 DYSPAREUNIA DUE TO MEDICAL CONDITION IN FEMALE: ICD-10-CM

## 2025-07-23 LAB
BILIRUB UR QL STRIP: NEGATIVE
GLUCOSE UR QL STRIP: NEGATIVE
KETONES UR QL STRIP: NEGATIVE
LEUKOCYTE ESTERASE UR QL STRIP: NEGATIVE
PH, POC UA: 6
POC BLOOD, URINE: NEGATIVE
POC NITRATES, URINE: NEGATIVE
PROT UR QL STRIP: NEGATIVE
SP GR UR STRIP: 1.01 (ref 1–1.03)
UROBILINOGEN UR STRIP-ACNC: 0.2 (ref 0.1–1.1)

## 2025-07-23 PROCEDURE — 3074F SYST BP LT 130 MM HG: CPT | Mod: CPTII,,, | Performed by: OBSTETRICS & GYNECOLOGY

## 2025-07-23 PROCEDURE — 99214 OFFICE O/P EST MOD 30 MIN: CPT | Mod: ,,, | Performed by: OBSTETRICS & GYNECOLOGY

## 2025-07-23 PROCEDURE — 81003 URINALYSIS AUTO W/O SCOPE: CPT | Mod: QW,,, | Performed by: OBSTETRICS & GYNECOLOGY

## 2025-07-23 PROCEDURE — 1159F MED LIST DOCD IN RCRD: CPT | Mod: CPTII,,, | Performed by: OBSTETRICS & GYNECOLOGY

## 2025-07-23 PROCEDURE — 3008F BODY MASS INDEX DOCD: CPT | Mod: CPTII,,, | Performed by: OBSTETRICS & GYNECOLOGY

## 2025-07-23 PROCEDURE — 3078F DIAST BP <80 MM HG: CPT | Mod: CPTII,,, | Performed by: OBSTETRICS & GYNECOLOGY

## 2025-08-04 ENCOUNTER — HOSPITAL ENCOUNTER (OUTPATIENT)
Dept: RADIOLOGY | Facility: HOSPITAL | Age: 30
Discharge: HOME | End: 2025-08-04
Attending: OBSTETRICS & GYNECOLOGY
Payer: COMMERCIAL

## 2025-08-04 DIAGNOSIS — R10.10 UPPER ABDOMINAL PAIN: ICD-10-CM

## 2025-08-04 DIAGNOSIS — R19.8 PAINFUL DEFECATION: ICD-10-CM

## 2025-08-04 DIAGNOSIS — R10.13 EPIGASTRIC PAIN: ICD-10-CM

## 2025-08-04 DIAGNOSIS — N94.19 DYSPAREUNIA DUE TO MEDICAL CONDITION IN FEMALE: ICD-10-CM

## 2025-08-04 DIAGNOSIS — R10.2 PELVIC PAIN: ICD-10-CM

## 2025-08-04 PROCEDURE — 76856 US EXAM PELVIC COMPLETE: CPT | Mod: TC

## 2025-08-04 PROCEDURE — 25500020 PHARM REV CODE 255: Performed by: OBSTETRICS & GYNECOLOGY

## 2025-08-04 PROCEDURE — 74177 CT ABD & PELVIS W/CONTRAST: CPT | Mod: TC

## 2025-08-04 RX ORDER — DIATRIZOATE MEGLUMINE AND DIATRIZOATE SODIUM 660; 100 MG/ML; MG/ML
30 SOLUTION ORAL; RECTAL
Status: COMPLETED | OUTPATIENT
Start: 2025-08-04 | End: 2025-08-04

## 2025-08-04 RX ADMIN — IOHEXOL 100 ML: 350 INJECTION, SOLUTION INTRAVENOUS at 07:08

## 2025-08-04 RX ADMIN — DIATRIZOATE MEGLUMINE AND DIATRIZOATE SODIUM 30 ML: 660; 100 LIQUID ORAL; RECTAL at 07:08

## 2025-08-13 ENCOUNTER — OFFICE VISIT (OUTPATIENT)
Dept: OBSTETRICS AND GYNECOLOGY | Facility: CLINIC | Age: 30
End: 2025-08-13
Payer: COMMERCIAL

## 2025-08-13 VITALS
WEIGHT: 140 LBS | BODY MASS INDEX: 25.76 KG/M2 | DIASTOLIC BLOOD PRESSURE: 60 MMHG | SYSTOLIC BLOOD PRESSURE: 110 MMHG | HEIGHT: 62 IN

## 2025-08-13 DIAGNOSIS — N80.9 ENDOMETRIOSIS: ICD-10-CM

## 2025-08-13 DIAGNOSIS — R10.2 CHRONIC PELVIC PAIN IN FEMALE: Primary | ICD-10-CM

## 2025-08-13 DIAGNOSIS — N94.19 DYSPAREUNIA DUE TO MEDICAL CONDITION IN FEMALE: ICD-10-CM

## 2025-08-13 DIAGNOSIS — R19.8 PAINFUL DEFECATION: ICD-10-CM

## 2025-08-13 DIAGNOSIS — G89.29 CHRONIC PELVIC PAIN IN FEMALE: Primary | ICD-10-CM

## 2025-08-13 DIAGNOSIS — N92.0 MENORRHAGIA WITH REGULAR CYCLE: ICD-10-CM

## 2025-08-13 PROCEDURE — 3008F BODY MASS INDEX DOCD: CPT | Mod: CPTII,,, | Performed by: OBSTETRICS & GYNECOLOGY

## 2025-08-13 PROCEDURE — 1159F MED LIST DOCD IN RCRD: CPT | Mod: CPTII,,, | Performed by: OBSTETRICS & GYNECOLOGY

## 2025-08-13 PROCEDURE — 3078F DIAST BP <80 MM HG: CPT | Mod: CPTII,,, | Performed by: OBSTETRICS & GYNECOLOGY

## 2025-08-13 PROCEDURE — 3074F SYST BP LT 130 MM HG: CPT | Mod: CPTII,,, | Performed by: OBSTETRICS & GYNECOLOGY

## 2025-08-13 PROCEDURE — 99214 OFFICE O/P EST MOD 30 MIN: CPT | Mod: ,,, | Performed by: OBSTETRICS & GYNECOLOGY

## 2025-08-13 RX ORDER — SODIUM CHLORIDE 9 MG/ML
INJECTION, SOLUTION INTRAVENOUS CONTINUOUS
OUTPATIENT
Start: 2025-08-13

## 2025-08-13 RX ORDER — CLINDAMYCIN PHOSPHATE 900 MG/50ML
900 INJECTION, SOLUTION INTRAVENOUS
OUTPATIENT
Start: 2025-08-13

## 2025-08-13 RX ORDER — FAMOTIDINE 20 MG/1
20 TABLET, FILM COATED ORAL
OUTPATIENT
Start: 2025-08-13

## 2025-08-13 RX ORDER — MUPIROCIN 20 MG/G
OINTMENT TOPICAL
OUTPATIENT
Start: 2025-08-13

## 2025-09-02 ENCOUNTER — HOSPITAL ENCOUNTER (OUTPATIENT)
Dept: PREADMISSION TESTING | Facility: HOSPITAL | Age: 30
Discharge: HOME | End: 2025-09-02
Attending: OBSTETRICS & GYNECOLOGY
Payer: COMMERCIAL

## 2025-09-02 VITALS — HEIGHT: 62 IN | WEIGHT: 140 LBS | BODY MASS INDEX: 25.76 KG/M2

## 2025-09-02 DIAGNOSIS — R10.2 CHRONIC PELVIC PAIN IN FEMALE: ICD-10-CM

## 2025-09-02 DIAGNOSIS — G89.29 CHRONIC PELVIC PAIN IN FEMALE: ICD-10-CM

## 2025-09-02 DIAGNOSIS — R19.8 PAINFUL DEFECATION: ICD-10-CM

## 2025-09-02 DIAGNOSIS — N94.19 DYSPAREUNIA DUE TO MEDICAL CONDITION IN FEMALE: ICD-10-CM

## 2025-09-02 DIAGNOSIS — N80.9 ENDOMETRIOSIS: ICD-10-CM

## 2025-09-02 DIAGNOSIS — N92.0 MENORRHAGIA WITH REGULAR CYCLE: ICD-10-CM

## 2025-09-02 LAB
ALBUMIN SERPL-MCNC: 4.7 G/DL (ref 3.5–5)
ALBUMIN/GLOB SERPL: 2 RATIO (ref 1.1–2)
ALP SERPL-CCNC: 57 UNIT/L (ref 40–150)
ALT SERPL-CCNC: 14 UNIT/L (ref 0–55)
ANION GAP SERPL CALC-SCNC: 9 MEQ/L
AST SERPL-CCNC: 16 UNIT/L (ref 11–45)
BASOPHILS # BLD AUTO: 0.05 X10(3)/MCL (ref 0.01–0.08)
BASOPHILS NFR BLD AUTO: 0.7 % (ref 0.1–1.2)
BILIRUB SERPL-MCNC: 0.3 MG/DL
BILIRUB UR QL STRIP.AUTO: NEGATIVE
BUN SERPL-MCNC: 12 MG/DL (ref 7–18.7)
CALCIUM SERPL-MCNC: 9.9 MG/DL (ref 8.4–10.2)
CHLORIDE SERPL-SCNC: 104 MMOL/L (ref 98–107)
CLARITY UR: CLEAR
CO2 SERPL-SCNC: 28 MMOL/L (ref 22–29)
COLOR UR AUTO: YELLOW
CREAT SERPL-MCNC: 0.78 MG/DL (ref 0.55–1.02)
CREAT/UREA NIT SERPL: 15
EOSINOPHIL # BLD AUTO: 0.09 X10(3)/MCL (ref 0.04–0.36)
EOSINOPHIL NFR BLD AUTO: 1.3 % (ref 0.7–7)
ERYTHROCYTE [DISTWIDTH] IN BLOOD BY AUTOMATED COUNT: 15.7 % (ref 11–14.5)
GFR SERPLBLD CREATININE-BSD FMLA CKD-EPI: >90 ML/MIN/1.73/M2
GLOBULIN SER-MCNC: 2.3 GM/DL (ref 2.4–3.5)
GLUCOSE SERPL-MCNC: 86 MG/DL (ref 74–100)
GLUCOSE UR QL STRIP: NEGATIVE
HCT VFR BLD AUTO: 37.1 % (ref 36–48)
HGB BLD-MCNC: 11.8 G/DL (ref 11.8–16)
HGB UR QL STRIP: NEGATIVE
IMM GRANULOCYTES # BLD AUTO: 0.01 X10(3)/MCL (ref 0–0.03)
IMM GRANULOCYTES NFR BLD AUTO: 0.1 % (ref 0–0.5)
KETONES UR QL STRIP: NEGATIVE
LEUKOCYTE ESTERASE UR QL STRIP: NEGATIVE
LYMPHOCYTES # BLD AUTO: 2.65 X10(3)/MCL (ref 1.16–3.74)
LYMPHOCYTES NFR BLD AUTO: 38.1 % (ref 20–55)
MCH RBC QN AUTO: 27.4 PG (ref 27–34)
MCHC RBC AUTO-ENTMCNC: 31.8 G/DL (ref 31–37)
MCV RBC AUTO: 86.3 FL (ref 79–99)
MONOCYTES # BLD AUTO: 0.71 X10(3)/MCL (ref 0.24–0.36)
MONOCYTES NFR BLD AUTO: 10.2 % (ref 4.7–12.5)
NEUTROPHILS # BLD AUTO: 3.44 X10(3)/MCL (ref 1.56–6.13)
NEUTROPHILS NFR BLD AUTO: 49.6 % (ref 37–73)
NITRITE UR QL STRIP: NEGATIVE
NRBC BLD AUTO-RTO: 0 %
PH UR STRIP: 6.5 [PH]
PLATELET # BLD AUTO: 265 X10(3)/MCL (ref 140–371)
PMV BLD AUTO: 10.1 FL (ref 9.4–12.4)
POTASSIUM SERPL-SCNC: 4 MMOL/L (ref 3.5–5.1)
PROT SERPL-MCNC: 7 GM/DL (ref 6.4–8.3)
PROT UR QL STRIP: NEGATIVE
RBC # BLD AUTO: 4.3 X10(6)/MCL (ref 4–5.1)
SODIUM SERPL-SCNC: 141 MMOL/L (ref 136–145)
SP GR UR STRIP.AUTO: 1.01 (ref 1–1.03)
UROBILINOGEN UR STRIP-ACNC: 0.2
WBC # BLD AUTO: 6.95 X10(3)/MCL (ref 4–11.5)

## 2025-09-02 PROCEDURE — 80053 COMPREHEN METABOLIC PANEL: CPT | Performed by: OBSTETRICS & GYNECOLOGY

## 2025-09-02 PROCEDURE — 85025 COMPLETE CBC W/AUTO DIFF WBC: CPT | Performed by: OBSTETRICS & GYNECOLOGY

## 2025-09-02 PROCEDURE — 81003 URINALYSIS AUTO W/O SCOPE: CPT | Performed by: OBSTETRICS & GYNECOLOGY
